# Patient Record
Sex: MALE | Race: BLACK OR AFRICAN AMERICAN | URBAN - METROPOLITAN AREA
[De-identification: names, ages, dates, MRNs, and addresses within clinical notes are randomized per-mention and may not be internally consistent; named-entity substitution may affect disease eponyms.]

---

## 2017-08-10 ENCOUNTER — HISTORICAL (OUTPATIENT)
Dept: ADMINISTRATIVE | Facility: HOSPITAL | Age: 67
End: 2017-08-10

## 2022-04-30 NOTE — OP NOTE
DATE OF SURGERY:        SURGEON:  Jose Denise MD    PREOPERATIVE DIAGNOSIS:  Non-clearing vitreous hemorrhage of the right eye.    POSTOPERATIVE DIAGNOSIS:  Non-clearing vitreous hemorrhage of the right eye.    PROCEDURE:  Pars plana vitrectomy with vitreous sampling and endolaser to the right eye.    ANESTHESIA:  General.    ESTIMATED BLOOD LOSS:  Less than 5 cc.    COMPLICATIONS:  None.    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  He also has a history of lymphoma.  He requires a vitrectomy to remove the vitreous hemorrhage as well as examination under anesthesia and vitreous sampling for possible lymphomatous cells.    PROCEDURE IN DETAIL:  The patient was taken to the operative theater where general anesthesia was begun.  The right eye was then prepped and draped in the normal sterile fashion and a lid speculum was applied.  A standard three port, 25-gauge pars plana vitrectomy was performed, with all trocars being placed 3.5 mm from the surgical limbus.  A core vitrectomy was performed including removal of the dense white vitreous hemorrhage and the posterior hyaloid out to the peripheral retina.  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  Vitreous was sent for cytopathology to look for lymphoma cells. The peripheral retina was examined and no retinal breaks were identified.  Endolaser was used to apply panretinal photocoagulation to the quadrant of the branch retinal artery occlusion.  All instruments were removed from the eye and all sclerotomies massaged closed with cotton tip swabs.  Several drops of TobraDex ophthalmic solution were applied to the eye.  The postop intraocular pressure was 15 mmHg.  The lid speculum and eye drape were then removed and the eye was covered with a gauze patch and a [employer] shield.  The patient  was then transported to the postop care unit     to recover.  Discharge condition is good.  Disposition is home with follow up with Dr. Denise the following day.  Patient tolerated the procedure well.        ______________________________  MD VEL Ascencio/ROS  DD:  08/17/2017  Time:  05:53PM  DT:  08/18/2017  Time:  11:49AM  Job #:  [id number]